# Patient Record
Sex: MALE | Race: WHITE | NOT HISPANIC OR LATINO
[De-identification: names, ages, dates, MRNs, and addresses within clinical notes are randomized per-mention and may not be internally consistent; named-entity substitution may affect disease eponyms.]

---

## 2023-01-09 ENCOUNTER — P2P PATIENT RECORD (OUTPATIENT)
Age: 33
End: 2023-01-09

## 2023-01-09 ENCOUNTER — TELEPHONE ENCOUNTER (OUTPATIENT)
Dept: URBAN - METROPOLITAN AREA CLINIC 63 | Facility: CLINIC | Age: 33
End: 2023-01-09

## 2023-01-12 ENCOUNTER — OFFICE VISIT (OUTPATIENT)
Dept: URBAN - METROPOLITAN AREA CLINIC 63 | Facility: CLINIC | Age: 33
End: 2023-01-12
Payer: COMMERCIAL

## 2023-01-12 ENCOUNTER — DASHBOARD ENCOUNTERS (OUTPATIENT)
Age: 33
End: 2023-01-12

## 2023-01-12 ENCOUNTER — WEB ENCOUNTER (OUTPATIENT)
Dept: URBAN - METROPOLITAN AREA CLINIC 63 | Facility: CLINIC | Age: 33
End: 2023-01-12

## 2023-01-12 VITALS
DIASTOLIC BLOOD PRESSURE: 60 MMHG | SYSTOLIC BLOOD PRESSURE: 110 MMHG | BODY MASS INDEX: 31.7 KG/M2 | OXYGEN SATURATION: 99 % | WEIGHT: 247 LBS | TEMPERATURE: 96.9 F | HEART RATE: 78 BPM | HEIGHT: 74 IN

## 2023-01-12 DIAGNOSIS — E66.3 OVERWEIGHT (BMI 25.0-29.9): ICD-10-CM

## 2023-01-12 DIAGNOSIS — Z80.0 FAMILY HISTORY OF COLON CANCER: ICD-10-CM

## 2023-01-12 DIAGNOSIS — K21.9 GERD: ICD-10-CM

## 2023-01-12 DIAGNOSIS — K92.1 RECTAL BLEEDING: ICD-10-CM

## 2023-01-12 PROBLEM — 235595009 GASTROESOPHAGEAL REFLUX DISEASE: Status: ACTIVE | Noted: 2023-01-12

## 2023-01-12 PROCEDURE — 99203 OFFICE O/P NEW LOW 30 MIN: CPT | Performed by: INTERNAL MEDICINE

## 2023-01-12 RX ORDER — OMEPRAZOLE 40 MG/1
1 CAPSULE 30 MINUTES BEFORE MORNING MEAL CAPSULE, DELAYED RELEASE ORAL ONCE A DAY
Qty: 90 CAPSULE | Refills: 3 | OUTPATIENT
Start: 2023-01-12

## 2023-01-12 RX ORDER — POLYETHYLENE GLYCOL 3350, SODIUM CHLORIDE, SODIUM BICARBONATE, POTASSIUM CHLORIDE 420; 11.2; 5.72; 1.48 G/4L; G/4L; G/4L; G/4L
AS DIRECTED POWDER, FOR SOLUTION ORAL ONCE
Qty: 4000 | Refills: 0 | OUTPATIENT
Start: 2023-01-12 | End: 2023-01-13

## 2023-01-12 RX ORDER — ONDANSETRON HYDROCHLORIDE 4 MG/1
1 TABLET TABLET, FILM COATED ORAL
Qty: 2 | Refills: 0 | OUTPATIENT
Start: 2023-01-12

## 2023-01-12 NOTE — PHYSICAL EXAM GASTROINTESTINAL
soft, nontender, nondistended , no masses palpable , normal bowel sounds ,  SIDNEY - yahir anal excoriation/ grade 2 hemorrhoids/ no bleeding/no significant pain

## 2023-01-12 NOTE — HPI-TODAY'S VISIT:
Vijay is a pleasant 32-year-old white male who is here for evaluation of rectal bleeding. He has been dealing with perianal discomfort for at least 2 years.  Scribes perianal irritation and itching periodically.  Occasionally reports painful bowel movements.  Blood on the toilet paper. In the past has used Preparation H and jock itch cream etc. with little relief.  He denies any constipation.  Denies any unintentional weight loss loss of appetite. Recently his maternal aunt was diagnosed with colon cancer in her 60s. He has occasional acid reflux especially when he drinks beer or orange juice.  Denies any dysphagia early satiety or bloating. Denies use of NSAIDs. He is a  and therefore has to be sitting down for hours at a stretch. He is physically active as well.  Goes to the gym 3-4 times a week.

## 2023-01-19 ENCOUNTER — LAB OUTSIDE AN ENCOUNTER (OUTPATIENT)
Dept: URBAN - METROPOLITAN AREA CLINIC 63 | Facility: CLINIC | Age: 33
End: 2023-01-19

## 2023-01-30 ENCOUNTER — OFFICE VISIT (OUTPATIENT)
Dept: URBAN - METROPOLITAN AREA SURGERY CENTER 4 | Facility: SURGERY CENTER | Age: 33
End: 2023-01-30